# Patient Record
Sex: FEMALE | Race: WHITE | NOT HISPANIC OR LATINO | URBAN - METROPOLITAN AREA
[De-identification: names, ages, dates, MRNs, and addresses within clinical notes are randomized per-mention and may not be internally consistent; named-entity substitution may affect disease eponyms.]

---

## 2018-11-30 ENCOUNTER — OFFICE VISIT (OUTPATIENT)
Dept: URBAN - METROPOLITAN AREA CLINIC 23 | Facility: CLINIC | Age: 28
End: 2018-11-30
Payer: COMMERCIAL

## 2018-11-30 DIAGNOSIS — H33.011 RETINAL DETACHMENT WITH SINGLE BREAK, RIGHT EYE: Primary | ICD-10-CM

## 2018-11-30 PROCEDURE — 99213 OFFICE O/P EST LOW 20 MIN: CPT | Performed by: OPHTHALMOLOGY

## 2018-11-30 PROCEDURE — 92134 CPTRZ OPH DX IMG PST SGM RTA: CPT | Performed by: OPHTHALMOLOGY

## 2018-11-30 ASSESSMENT — INTRAOCULAR PRESSURE
OS: 13
OD: 13

## 2018-11-30 NOTE — IMPRESSION/PLAN
Impression: s/p PPVX for repair of Retinal detachment with single break, right eye with Gas 08/09/2017 w/Dr. Cely Martínez: H33.011. OD. Condition: stable. Vision: vision improved. Hx of Repair of RD OS x 3 surgeries with Dr. Domonique Al in 2010 - SB OS. Hx of Stickler's Syndrome Plan: Discussed diagnosis in detail with patient. Exam OD shows the retina is fully attached, no edema or ERM seen, OS exam also shows the retina is attached and stable. No treatment is required at this time. Will continue to observe condition and or symptoms. OCT is stable OU.

## 2019-08-10 ENCOUNTER — OFFICE VISIT (OUTPATIENT)
Dept: URBAN - METROPOLITAN AREA CLINIC 22 | Facility: CLINIC | Age: 29
End: 2019-08-10
Payer: COMMERCIAL

## 2019-08-10 DIAGNOSIS — G51.4 FACIAL MYOKYMIA: Primary | ICD-10-CM

## 2019-08-10 PROCEDURE — 99212 OFFICE O/P EST SF 10 MIN: CPT | Performed by: OPTOMETRIST

## 2019-08-10 ASSESSMENT — INTRAOCULAR PRESSURE: OD: 20

## 2019-08-10 NOTE — IMPRESSION/PLAN
Impression: Facial myokymia: G51.4. Plan: Patient ed. Decrease stress, increase sleep and decrease caffeine. Patient wearing Caprice contact in OD. Needs to D/C wear and RTC for exam and fitting in better quality contact.

## 2020-12-01 ENCOUNTER — OFFICE VISIT (OUTPATIENT)
Dept: URBAN - METROPOLITAN AREA CLINIC 23 | Facility: CLINIC | Age: 30
End: 2020-12-01
Payer: COMMERCIAL

## 2020-12-01 DIAGNOSIS — H57.12 OCULAR PAIN, LEFT EYE: Primary | ICD-10-CM

## 2020-12-01 PROCEDURE — 99213 OFFICE O/P EST LOW 20 MIN: CPT | Performed by: OPTOMETRIST

## 2020-12-01 ASSESSMENT — KERATOMETRY
OS: 45.50
OD: 44.88

## 2020-12-01 NOTE — IMPRESSION/PLAN
Impression: Ocular pain, left eye: H57.12. Plan: Discussed that IOL is entrapped and SB is partially extruding. Either issues may cause pain. Recommend consult with Dr. Marvin Lopez. She declined steroid Rx for inflammation.

## 2020-12-31 ENCOUNTER — OFFICE VISIT (OUTPATIENT)
Dept: URBAN - METROPOLITAN AREA CLINIC 23 | Facility: CLINIC | Age: 30
End: 2020-12-31
Payer: COMMERCIAL

## 2020-12-31 DIAGNOSIS — H21.89 OTHER SPECIFIED DISORDER OF IRIS: ICD-10-CM

## 2020-12-31 PROCEDURE — 99213 OFFICE O/P EST LOW 20 MIN: CPT | Performed by: OPHTHALMOLOGY

## 2020-12-31 PROCEDURE — 92134 CPTRZ OPH DX IMG PST SGM RTA: CPT | Performed by: OPHTHALMOLOGY

## 2020-12-31 ASSESSMENT — INTRAOCULAR PRESSURE
OS: 10
OD: 15

## 2020-12-31 NOTE — IMPRESSION/PLAN
Impression: s/p PPVX for repair of Retinal detachment with single break, right eye with Gas 08/09/2017 w/Dr. Justin Johnson: H33.011. OD. Condition: stable. Vision: vision improved. Hx of Repair of RD OS x 3 surgeries with Dr. Lisa Tierney in 2010 - SB OS. Hx of Stickler's Syndrome Plan: Due to Coronavirus COVID-19 pandemic and National Emergency, deferred Slit Lamp examination. Findings are based on OCT and Optos. OCT and Optos shows retina attached, stable OU. No treatment is required at this time. Recommend a retina follow - up in 1 year. Advised patient to call if any changes in vision before then.

## 2020-12-31 NOTE — IMPRESSION/PLAN
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 Condition: stable. Vision: vision not affected. Plan: Advised patient of condition. Discussed diagnosis in detail with patient. No treatment is required at this time. Exam shows iris lens capture OS. Advised patient this is not worrisome, will follow-up in 1 year. Patient advised to call with any changes in vision before then.

## 2022-09-01 ENCOUNTER — OFFICE VISIT (OUTPATIENT)
Dept: URBAN - METROPOLITAN AREA CLINIC 33 | Facility: CLINIC | Age: 32
End: 2022-09-01
Payer: COMMERCIAL

## 2022-09-01 DIAGNOSIS — H33.011 RETINAL DETACHMENT WITH SINGLE BREAK, RIGHT EYE: Primary | ICD-10-CM

## 2022-09-01 PROCEDURE — 92134 CPTRZ OPH DX IMG PST SGM RTA: CPT | Performed by: OPHTHALMOLOGY

## 2022-09-01 PROCEDURE — 92012 INTRM OPH EXAM EST PATIENT: CPT | Performed by: OPHTHALMOLOGY

## 2022-09-01 ASSESSMENT — INTRAOCULAR PRESSURE
OS: 17
OD: 20

## 2022-09-01 NOTE — IMPRESSION/PLAN
Impression: s/p PPVX for repair of Retinal detachment with single break, right eye with Gas 08/09/2017 w/Dr. Hillary Rahman: H33.011. OD. Condition: stable. Vision: vision improved. Hx of Repair of RD OS x 3 surgeries with Dr. Shar Kelley in 2010 - SB OS Hx of Stickler's Syndrome Plan: Discussed diagnosis in detail with patient. Exam OD shows retina is fully attached, no retinal tears seen. Discussed treatment options with patient. No treatment is required at this time. Will continue to observe condition and or symptoms. OCT and Optos OU shows stable appearing. Patient asked what are the chances of retina detaching in OD again, explained it's very unlikely for the retina to detach again since surgery was over 6 months ago. Recommend a retina f/u in 3 months.

## 2023-07-12 ENCOUNTER — OFFICE VISIT (OUTPATIENT)
Dept: URBAN - METROPOLITAN AREA CLINIC 22 | Facility: CLINIC | Age: 33
End: 2023-07-12

## 2023-07-12 DIAGNOSIS — H52.223 REGULAR ASTIGMATISM, BILATERAL: Primary | ICD-10-CM

## 2023-07-12 PROCEDURE — 92012 INTRM OPH EXAM EST PATIENT: CPT | Performed by: STUDENT IN AN ORGANIZED HEALTH CARE EDUCATION/TRAINING PROGRAM

## 2023-07-12 PROCEDURE — 92310 CONTACT LENS FITTING OU: CPT | Performed by: STUDENT IN AN ORGANIZED HEALTH CARE EDUCATION/TRAINING PROGRAM

## 2023-07-12 ASSESSMENT — INTRAOCULAR PRESSURE
OD: 13
OS: 17

## 2023-07-12 ASSESSMENT — KERATOMETRY: OD: 45.38

## 2023-07-12 ASSESSMENT — VISUAL ACUITY: OD: 20/30

## 2023-07-12 NOTE — IMPRESSION/PLAN
Impression: Regular astigmatism, bilateral: H52.223. Plan: Discussed findings. Glasses Rx finalized. Ordered CL trials. OK for pt to  trials upon arrival, then schedule follow-up. Patient has DFE scheduled with Dr. Hillary Rahman next week.

## 2023-07-19 ENCOUNTER — OFFICE VISIT (OUTPATIENT)
Dept: URBAN - METROPOLITAN AREA CLINIC 23 | Facility: CLINIC | Age: 33
End: 2023-07-19
Payer: COMMERCIAL

## 2023-07-19 DIAGNOSIS — H33.011 RETINAL DETACHMENT WITH SINGLE BREAK, RIGHT EYE: Primary | ICD-10-CM

## 2023-07-19 PROCEDURE — 92012 INTRM OPH EXAM EST PATIENT: CPT | Performed by: OPHTHALMOLOGY

## 2023-07-19 PROCEDURE — 92134 CPTRZ OPH DX IMG PST SGM RTA: CPT | Performed by: OPHTHALMOLOGY

## 2023-07-19 ASSESSMENT — INTRAOCULAR PRESSURE
OS: 17
OD: 16

## 2023-07-19 NOTE — IMPRESSION/PLAN
Impression: s/p PPVX for repair of Retinal detachment with single break, right eye with Gas 08/09/2017 w/Dr. Marvin Lopez: H33.011. OD. Condition: stable. Vision: vision improved. Hx of Repair of RD OS x 3 surgeries with Dr. Severa Crass in 2010 - SB OS Hx of Stickler's Syndrome Plan: Discussed diagnosis in detail with patient. Exam OD shows retina is fully attached. OCT and Optos OD confirms findings. Discussed treatment options with patient. No treatment is required at this time. Will continue to observe condition and or symptoms. Recommend a retina f/u in 1 year - sooner if any changes.

## 2024-07-23 ENCOUNTER — OFFICE VISIT (OUTPATIENT)
Dept: URBAN - METROPOLITAN AREA CLINIC 23 | Facility: CLINIC | Age: 34
End: 2024-07-23
Payer: COMMERCIAL

## 2024-07-23 DIAGNOSIS — H33.011 RETINAL DETACHMENT WITH SINGLE BREAK, RIGHT EYE: Primary | ICD-10-CM

## 2024-07-23 PROCEDURE — 92012 INTRM OPH EXAM EST PATIENT: CPT | Performed by: OPHTHALMOLOGY

## 2024-07-23 PROCEDURE — 92134 CPTRZ OPH DX IMG PST SGM RTA: CPT | Performed by: OPHTHALMOLOGY

## 2024-07-23 ASSESSMENT — INTRAOCULAR PRESSURE
OD: 19
OS: 24

## 2025-07-22 ENCOUNTER — OFFICE VISIT (OUTPATIENT)
Dept: URBAN - METROPOLITAN AREA CLINIC 23 | Facility: CLINIC | Age: 35
End: 2025-07-22
Payer: COMMERCIAL

## 2025-07-22 DIAGNOSIS — H33.011 RETINAL DETACHMENT WITH SINGLE BREAK, RIGHT EYE: Primary | ICD-10-CM

## 2025-07-22 PROCEDURE — 92134 CPTRZ OPH DX IMG PST SGM RTA: CPT | Performed by: OPHTHALMOLOGY

## 2025-07-22 PROCEDURE — 92012 INTRM OPH EXAM EST PATIENT: CPT | Performed by: OPHTHALMOLOGY

## 2025-07-22 ASSESSMENT — INTRAOCULAR PRESSURE
OD: 12
OS: 12